# Patient Record
Sex: MALE | Race: WHITE
[De-identification: names, ages, dates, MRNs, and addresses within clinical notes are randomized per-mention and may not be internally consistent; named-entity substitution may affect disease eponyms.]

---

## 2018-08-06 ENCOUNTER — HOSPITAL ENCOUNTER (OUTPATIENT)
Dept: HOSPITAL 45 - C.CPL | Age: 53
Discharge: HOME | End: 2018-08-06
Attending: ORTHOPAEDIC SURGERY
Payer: COMMERCIAL

## 2018-08-06 DIAGNOSIS — M25.562: Primary | ICD-10-CM

## 2018-08-06 LAB
BASOPHILS # BLD: 0.06 K/UL (ref 0–0.2)
BASOPHILS NFR BLD: 1 %
BUN SERPL-MCNC: 15 MG/DL (ref 7–18)
CALCIUM SERPL-MCNC: 8.9 MG/DL (ref 8.5–10.1)
CO2 SERPL-SCNC: 31 MMOL/L (ref 21–32)
CREAT SERPL-MCNC: 0.89 MG/DL (ref 0.6–1.4)
EOS ABS #: 0.33 K/UL (ref 0–0.5)
EOSINOPHIL NFR BLD AUTO: 223 K/UL (ref 130–400)
GLUCOSE SERPL-MCNC: 88 MG/DL (ref 70–99)
HCT VFR BLD CALC: 41.4 % (ref 42–52)
HGB BLD-MCNC: 14.4 G/DL (ref 14–18)
IG#: 0.01 K/UL (ref 0–0.02)
IMM GRANULOCYTES NFR BLD AUTO: 32.8 %
LYMPHOCYTES # BLD: 1.89 K/UL (ref 1.2–3.4)
MCH RBC QN AUTO: 31.5 PG (ref 25–34)
MCHC RBC AUTO-ENTMCNC: 34.8 G/DL (ref 32–36)
MCV RBC AUTO: 90.6 FL (ref 80–100)
MONO ABS #: 0.6 K/UL (ref 0.11–0.59)
MONOCYTES NFR BLD: 10.4 %
NEUT ABS #: 2.88 K/UL (ref 1.4–6.5)
NEUTROPHILS # BLD AUTO: 5.7 %
NEUTROPHILS NFR BLD AUTO: 49.9 %
PMV BLD AUTO: 9 FL (ref 7.4–10.4)
POTASSIUM SERPL-SCNC: 4 MMOL/L (ref 3.5–5.1)
RED CELL DISTRIBUTION WIDTH CV: 13.2 % (ref 11.5–14.5)
RED CELL DISTRIBUTION WIDTH SD: 43.4 FL (ref 36.4–46.3)
SODIUM SERPL-SCNC: 140 MMOL/L (ref 136–145)
WBC # BLD AUTO: 5.77 K/UL (ref 4.8–10.8)

## 2018-08-23 ENCOUNTER — HOSPITAL ENCOUNTER (OUTPATIENT)
Dept: HOSPITAL 45 - X.SURG | Age: 53
Discharge: HOME | End: 2018-08-23
Attending: ORTHOPAEDIC SURGERY
Payer: COMMERCIAL

## 2018-08-23 VITALS — HEART RATE: 50 BPM | OXYGEN SATURATION: 98 % | DIASTOLIC BLOOD PRESSURE: 74 MMHG | SYSTOLIC BLOOD PRESSURE: 115 MMHG

## 2018-08-23 VITALS
HEIGHT: 70.98 IN | WEIGHT: 205.43 LBS | HEIGHT: 70.98 IN | BODY MASS INDEX: 28.76 KG/M2 | WEIGHT: 205.43 LBS | BODY MASS INDEX: 28.76 KG/M2

## 2018-08-23 VITALS — TEMPERATURE: 97.52 F

## 2018-08-23 DIAGNOSIS — S83.282A: ICD-10-CM

## 2018-08-23 DIAGNOSIS — Z79.82: ICD-10-CM

## 2018-08-23 DIAGNOSIS — M19.90: ICD-10-CM

## 2018-08-23 DIAGNOSIS — K21.9: ICD-10-CM
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
## 2018-08-23 DIAGNOSIS — Z88.5: ICD-10-CM

## 2018-08-23 DIAGNOSIS — M94.262: Primary | ICD-10-CM

## 2018-08-23 NOTE — DISCHARGE INSTRUCTIONS-[facility]
Discharge Instructions


Date of Service


Aug 23, 2018.





Visit


Reason for Visit:  Left Knee Acute Lateral Meniscal Tear





Discharge


Discharge Diagnosis / Problem:  SAME AS ABOVE





Discharge Goals


Goal(s):  Decrease discomfort, Improve function





Medications


Stopped Medications Name(s):  


ASA Fish Oil Last dose approx 1 week ago


Restart Stopped Medication(s):


MAY RESTART 8/23/2018





Activity Recommendations


Activity Limitations:  as noted below


Shower/Bathe:  tomorrow





Anesthesia


.





Post Anesthesia Instructions:





If you have had General Anesthesia or IV Sedation:





*  Do not drive today.


*  Resume driving when surgeon permits.


*  Do not make important decisions or sign legal documents today.


*  Call surgeon for:





   1.  Temperature elevations greater than 101 degrees F.


   2.  Uncontrollable pain.


   3.  Excessive bleeding.


   4.  Persistent nausea and vomiting.


   5.  Medication intolerance (nausea, vomiting or rash).





*  For nausea and vomiting use only clear liquids such as: tea, soda, bouillon 

until nausea subsides, then gradually increase diet as tolerated.





*  If you have any concerns or questions, call your surgeon's office.  If 

physician is unavailable and it is an emergency, call 911 or go to the nearest 

emergency room.





.





Instructions / Follow-Up


Instructions / Follow-Up





MEDICATIONS:





*  Resume previous medications unless instructed otherwise by your surgeon.





*  Always take pain medication on a full stomach or with food to avoid upset 

stomach. 





*  Do not drink alcohol or drive while taking narcotics.





*  Ibuprofen or Tylenol may be taken if narcotic not needed.








SPECIAL CARE INSTRUCTIONS:





__ None





_X_ Keep extremity elevated and iced x 48 hours; apply ice 20-30


    minutes 8-10 times/day. May remove at night.





_X_ Crutches


    _X_ May discard when able





__ Brace/Post-op shoe


    __ 24 hrs/day


    __ Remove at night





_X_ Dressing


    __ Maintain until seen in office, may shower with plastic over site


    _X_ Remove dressings in 24-48 hours and then may shower


    _X_ Cover incisions with band-aids after showering


    __ Do not remove steri-strips





Call physician if chills or temperature rises above 102 degrees or


pain unrelieved by prescribed pain medications. 


Office [phone]





Diet Recommendations


Home Diet:  no limitations


Fluid Restriction:  None





Procedures


Procedures Performed:  


Left Knee Arthroscopy With Partial Lateral Meniscectomy, Chondroplasty





Pending Studies


Studies pending at discharge:  no





Work Instructions


Return To Work:  after follow-up





Medical Emergencies








.


Who to Call and When:





Medical Emergencies:  If at any time you feel your situation is an emergency, 

please call 911 immediately.





.





Non-Emergent Contact


Non-Emergency issues call your:  Surgeon


Call Non-Emergent contact if:  your pain is not controlled, wound has increased 

drainage, wound has increased redness





.


.








"Provider Documentation" section prepared by Tony Aguayo.








.

## 2018-08-23 NOTE — [facility] POST OPERATIVE BRIEF NOTE
Immediate Operative Summary


Operative Date


Aug 23, 2018.





Pre-Operative Diagnosis





Left Acute Lateral Meniscal Tear





Post-Operative Diagnosis





Same with chondromalacia





Procedure(s) Performed





Left Knee Arthroscopy With Partial Lateral Meniscectomy, Chondroplasty





Surgeon


Dr. Cortez





Assistant Surgeon(s)


BRUCE Aguayo PA-C





Estimated Blood Loss


5 ml





Findings


Consistent with Post-Op Diagnosis





Specimens





None





Anesthesia Type


General

## 2018-08-23 NOTE — ANESTHESIA PROGRESS NT - [facility]
Anesthesia Post Op Note


Date & Time


Aug 23, 2018 at 09:15





Vital Signs


Pain Intensity:  0





Vital Signs Past 12 Hours








  Date Time  Temp Pulse Resp B/P (MAP) Pulse Ox O2 Delivery O2 Flow Rate FiO2


 


8/23/18 09:08  57 15     


 


8/23/18 09:08  57 15  97   


 


8/23/18 09:06    114/74    


 


8/23/18 09:03  62 12  96   


 


8/23/18 09:03  61 12     


 


8/23/18 09:02  70 17     


 


8/23/18 09:02  73 17  96   


 


8/23/18 09:00    118/82    


 


8/23/18 08:57 36.2 71 16 118/82 97 Room Air  


 


8/23/18 08:57   15     


 


8/23/18 08:57  74 15     


 


8/23/18 08:55    111/80    


 


8/23/18 08:52  68 9  96   


 


8/23/18 08:52  66 9     


 


8/23/18 08:50    108/86    


 


8/23/18 08:47  47 7  99   


 


8/23/18 08:47  51 7     


 


8/23/18 08:45    118/79    


 


8/23/18 08:42  66 12  100   


 


8/23/18 08:42  68 12     


 


8/23/18 08:40    120/73    


 


8/23/18 08:37  55 8  99   


 


8/23/18 08:37  54 8     


 


8/23/18 08:35    120/71    


 


8/23/18 08:32    121/69    


 


8/23/18 08:32 36.4 73 12 121/69 100 Mask 6 


 


8/23/18 06:27 36.7 53 18 128/86 (100) 96 Room Air  











Notes


Mental Status:  alert / awake / arousable, participated in evaluation


Pt Amnestic to Procedure:  Yes


Nausea / Vomiting:  adequately controlled


Pain:  adequately controlled


Airway Patency, RR, SpO2:  stable & adequate


BP & HR:  stable & adequate


Hydration State:  stable & adequate


Anesthetic Complications:  no major complications apparent
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
